# Patient Record
Sex: FEMALE | Race: WHITE | URBAN - METROPOLITAN AREA
[De-identification: names, ages, dates, MRNs, and addresses within clinical notes are randomized per-mention and may not be internally consistent; named-entity substitution may affect disease eponyms.]

---

## 2018-01-03 ENCOUNTER — OFFICE VISIT (OUTPATIENT)
Dept: FAMILY MEDICINE | Facility: CLINIC | Age: 28
End: 2018-01-03
Payer: COMMERCIAL

## 2018-01-03 VITALS
WEIGHT: 186 LBS | HEART RATE: 68 BPM | SYSTOLIC BLOOD PRESSURE: 112 MMHG | BODY MASS INDEX: 30.99 KG/M2 | HEIGHT: 65 IN | TEMPERATURE: 98.3 F | DIASTOLIC BLOOD PRESSURE: 68 MMHG

## 2018-01-03 DIAGNOSIS — N39.0 URINARY TRACT INFECTION WITHOUT HEMATURIA, SITE UNSPECIFIED: Primary | ICD-10-CM

## 2018-01-03 DIAGNOSIS — R30.0 DYSURIA: ICD-10-CM

## 2018-01-03 LAB
BACTERIA #/AREA URNS HPF: ABNORMAL /HPF
NON-SQ EPI CELLS #/AREA URNS LPF: ABNORMAL /LPF
RBC #/AREA URNS AUTO: ABNORMAL /HPF
WBC #/AREA URNS AUTO: ABNORMAL /HPF

## 2018-01-03 PROCEDURE — 99213 OFFICE O/P EST LOW 20 MIN: CPT | Performed by: PHYSICIAN ASSISTANT

## 2018-01-03 PROCEDURE — 81015 MICROSCOPIC EXAM OF URINE: CPT | Performed by: PHYSICIAN ASSISTANT

## 2018-01-03 PROCEDURE — 87086 URINE CULTURE/COLONY COUNT: CPT | Performed by: PHYSICIAN ASSISTANT

## 2018-01-03 RX ORDER — CIPROFLOXACIN 250 MG/1
250 TABLET, FILM COATED ORAL 2 TIMES DAILY
Qty: 6 TABLET | Refills: 0 | Status: SHIPPED | OUTPATIENT
Start: 2018-01-03 | End: 2018-02-16

## 2018-01-03 NOTE — MR AVS SNAPSHOT
After Visit Summary   1/3/2018    Kathie Hidalgo    MRN: [id number]           Patient Information     Date Of Birth          1990        Visit Information        Provider Department      1/3/2018 7:20 AM Haily Hart PA-C [facility]        Today's Diagnoses     Urinary tract infection without hematuria, site unspecified    -  1    Dysuria          Care Instructions    Increase fluids  Continue with AZO until burning improves  Antibiotics as recommended.    Call if not improving!    DEVAN Sevilla PA-C            Follow-ups after your visit        Who to contact     If you have questions or need follow up information about today's clinic visit or your schedule please contact [facility] directly at [phone].  Normal or non-critical lab and imaging results will be communicated to you by Loctronixhart, letter or phone within 4 business days after the clinic has received the results. If you do not hear from us within 7 days, please contact the clinic through Loctronixhart or phone. If you have a critical or abnormal lab result, we will notify you by phone as soon as possible.  Submit refill requests through [email or web address] or call your pharmacy and they will forward the refill request to us. Please allow 3 business days for your refill to be completed.          Additional Information About Your Visit        MyChart Information     [email or web address] gives you secure access to your electronic health record. If you see a primary care provider, you can also send messages to your care team and make appointments. If you have questions, please call your primary care clinic.  If you do not have a primary care provider, please call [phone] and they will assist you.        Care EveryWhere ID     This is your Care EveryWhere ID. This could be used by other organizations to access your [place] medical records  [id number]        Your Vitals Were     Pulse Temperature Height BMI  "(Body Mass Index)          68 98.3  F (36.8  C) (Oral) 5' 5.25\" (1.657 m) 30.72 kg/m2         Blood Pressure from Last 3 Encounters:   01/03/18 112/68   04/13/16 112/73   08/25/15 114/75    Weight from Last 3 Encounters:   01/03/18 186 lb (84.4 kg)   04/13/16 167 lb (75.8 kg)   08/25/15 164 lb 1.6 oz (74.4 kg)              We Performed the Following     Urine Culture Aerobic Bacterial     Urine Microscopic          Today's Medication Changes          These changes are accurate as of: 1/3/18  7:57 AM.  If you have any questions, ask your nurse or doctor.               Start taking these medicines.        Dose/Directions    ciprofloxacin 250 MG tablet   Commonly known as:  CIPRO   Used for:  Dysuria   Started by:  Haily Hart PA-C        Dose:  250 mg   Take 1 tablet (250 mg) by mouth 2 times daily   Quantity:  6 tablet   Refills:  0            Where to get your medicines      These medications were sent to [facility] [place] [address]  [address] [facility], [facility] [address]     Phone:  [phone]     ciprofloxacin 250 MG tablet                Primary Care Provider    None Specified       No primary provider on file.        Equal Access to Services     [facility]UPERTO ROBB[facility]: Brionna herberto Soalexali, waaxda luqadaha, qaybta kaalmada adeegyada, nilsa stahl. So [facility] [phone].    ATENCIÓN: Si habla español, tiene a borges disposición servicios gratuitos de asistencia lingüística. Llame al [phone].    We comply with applicable federal civil rights laws and [place] laws. We do not discriminate on the basis of race, color, national origin, age, disability, sex, sexual orientation, or gender identity.            Thank you!     Thank you for choosing [facility]  for your care. Our goal is always to provide you with excellent care. Hearing back from our patients is one way we can continue to improve our services. " Please take a few minutes to complete the written survey that you may receive in the mail after your visit with us. Thank you!             Your Updated Medication List - Protect others around you: Learn how to safely use, store and throw away your medicines at www.disposemymeds.org.          This list is accurate as of: 1/3/18  7:57 AM.  Always use your most recent med list.                   Brand Name Dispense Instructions for use Diagnosis    ciprofloxacin 250 MG tablet    CIPRO    6 tablet    Take 1 tablet (250 mg) by mouth 2 times daily    Dysuria

## 2018-01-03 NOTE — PROGRESS NOTES
"  SUBJECTIVE:   Kathie Hidalgo is a 27 year old female who presents to clinic today for the following health issues:      URINARY TRACT SYMPTOMS  Onset: 2 days     Description:   Painful urination (Dysuria): YES  Blood in urine (Hematuria): no   Delay in urine (Hesitency): no     Intensity: moderate    Progression of Symptoms:  Worsening, couldn't sleep    Accompanying Signs & Symptoms:  Fever/chills: no   Flank pain no   Nausea and vomiting: no   Any vaginal symptoms: none  Abdominal/Pelvic Pain: no     History:   History of frequent UTI's: YES  History of kidney stones: no   Sexually Active: YES, does goes to the bathroom   Possibility of pregnancy: No    Precipitating factors:   None     Therapies Tried and outcome: AZO this morning       -------------------------------------    Problem list and histories reviewed & adjusted, as indicated.  Additional history: as documented    Reviewed and updated as needed this visit by clinical staff     Reviewed and updated as needed this visit by Provider         ROS:  Constitutional, HEENT, cardiovascular, pulmonary, gi and gu systems are negative, except as otherwise noted.      OBJECTIVE:   /68 (BP Location: Right arm, Cuff Size: Adult Regular)  Pulse 68  Temp 98.3  F (36.8  C) (Oral)  Ht 5' 5.25\" (1.657 m)  Wt 186 lb (84.4 kg)  BMI 30.72 kg/m2  Body mass index is 30.72 kg/(m^2).  GENERAL: healthy, alert and no distress  RESP: lungs clear to auscultation - no rales, rhonchi or wheezes  CV: regular rate and rhythm, normal S1 S2, no S3 or S4, no murmur, click or rub, no peripheral edema and peripheral pulses strong  ABDOMEN: soft, nontender, no hepatosplenomegaly, no masses and bowel sounds normal  BACK: no CVA tenderness, no paralumbar tenderness    Diagnostic Test Results:  none     ASSESSMENT/PLAN:       ICD-10-CM    1. Urinary tract infection without hematuria, site unspecified N39.0    2. Dysuria R30.0 Urine Microscopic     Urine Culture Aerobic Bacterial     " ciprofloxacin (CIPRO) 250 MG tablet     CANCELED: *UA reflex to Microscopic and Culture ([place] and [facility] (except [place] and [place])     UA with +bacteria, WBCs.  Encouraged increase in fluids, voiding before and after intercourse, and good hygiene. Signs and symptoms of pyelonephritis mentioned.  Follow up with primary care physician if not improving.    Haily Hart PA-C  [facility]

## 2018-01-03 NOTE — PATIENT INSTRUCTIONS
Increase fluids  Continue with AZO until burning improves  Antibiotics as recommended.    Call if not improving!    DEVAN Sevilla, PASrinivasanC

## 2018-01-04 LAB
BACTERIA SPEC CULT: NORMAL
SPECIMEN SOURCE: NORMAL

## 2018-02-16 ENCOUNTER — OFFICE VISIT (OUTPATIENT)
Dept: FAMILY MEDICINE | Facility: CLINIC | Age: 28
End: 2018-02-16

## 2018-02-16 VITALS
DIASTOLIC BLOOD PRESSURE: 57 MMHG | OXYGEN SATURATION: 98 % | RESPIRATION RATE: 18 BRPM | WEIGHT: 187 LBS | TEMPERATURE: 98.5 F | HEIGHT: 65 IN | BODY MASS INDEX: 31.16 KG/M2 | SYSTOLIC BLOOD PRESSURE: 101 MMHG | HEART RATE: 81 BPM

## 2018-02-16 DIAGNOSIS — J01.90 ACUTE SINUSITIS, RECURRENCE NOT SPECIFIED, UNSPECIFIED LOCATION: Primary | ICD-10-CM

## 2018-02-16 PROCEDURE — 99213 OFFICE O/P EST LOW 20 MIN: CPT | Performed by: NURSE PRACTITIONER

## 2018-02-16 RX ORDER — AMOXICILLIN 875 MG
875 TABLET ORAL 2 TIMES DAILY
Qty: 20 TABLET | Refills: 0 | Status: SHIPPED | OUTPATIENT
Start: 2018-02-16

## 2018-02-16 NOTE — NURSING NOTE
"Chief Complaint   Patient presents with     Pharyngitis       Initial /57 (BP Location: Right arm, Patient Position: Chair, Cuff Size: Adult Large)  Pulse 81  Temp 98.5  F (36.9  C) (Oral)  Resp 18  Ht 5' 5.25\" (1.657 m)  Wt 187 lb (84.8 kg)  LMP 02/01/2018  SpO2 98%  BMI 30.88 kg/m2 Estimated body mass index is 30.88 kg/(m^2) as calculated from the following:    Height as of this encounter: 5' 5.25\" (1.657 m).    Weight as of this encounter: 187 lb (84.8 kg).  Medication Reconciliation: kalina Engle CMA (AAMA)      "

## 2018-02-16 NOTE — MR AVS SNAPSHOT
After Visit Summary   2/16/2018    Kathie Hidalgo    MRN: [id number]           Patient Information     Date Of Birth          1990        Visit Information        Provider Department      2/16/2018 3:30 PM Danette Noyola APRN CNP [facility]        Today's Diagnoses     Acute sinusitis, recurrence not specified, unspecified location    -  1      Care Instructions    Begin taking plain mucinex 600mg twice a day  And use flonase 2 sprays each nostril once a day  Take aleve for pain   Take the antibiotic twice a day for 10 days           Follow-ups after your visit        Who to contact     If you have questions or need follow up information about today's clinic visit or your schedule please contact [facility] directly at [phone].  Normal or non-critical lab and imaging results will be communicated to you by SanT[employer]¡stihart, letter or phone within 4 business days after the clinic has received the results. If you do not hear from us within 7 days, please contact the clinic through SanT[employer]¡stihart or phone. If you have a critical or abnormal lab result, we will notify you by phone as soon as possible.  Submit refill requests through Atmosferiq or call your pharmacy and they will forward the refill request to us. Please allow 3 business days for your refill to be completed.          Additional Information About Your Visit        MyChart Information     [employer] gives you secure access to your electronic health record. If you see a primary care provider, you can also send messages to your care team and make appointments. If you have questions, please call your primary care clinic.  If you do not have a primary care provider, please call [phone] and they will assist you.        Care EveryWhere ID     This is your Care EveryWhere ID. This could be used by other organizations to access your [place] medical records  [id number]        Your Vitals Were     Pulse Temperature Respirations  "Height Last Period Pulse Oximetry    81 98.5  F (36.9  C) (Oral) 18 5' 5.25\" (1.657 m) 02/01/2018 98%    BMI (Body Mass Index)                   30.88 kg/m2            Blood Pressure from Last 3 Encounters:   02/16/18 101/57   01/03/18 112/68   04/13/16 112/73    Weight from Last 3 Encounters:   02/16/18 187 lb (84.8 kg)   01/03/18 186 lb (84.4 kg)   04/13/16 167 lb (75.8 kg)              Today, you had the following     No orders found for display         Today's Medication Changes          These changes are accurate as of 2/16/18  4:06 PM.  If you have any questions, ask your nurse or doctor.               Start taking these medicines.        Dose/Directions    amoxicillin 875 MG tablet   Commonly known as:  AMOXIL   Used for:  Acute sinusitis, recurrence not specified, unspecified location   Started by:  Danette Noyola APRN CNP        Dose:  875 mg   Take 1 tablet (875 mg) by mouth 2 times daily   Quantity:  20 tablet   Refills:  0         Stop taking these medicines if you haven't already. Please contact your care team if you have questions.     ciprofloxacin 250 MG tablet   Commonly known as:  CIPRO   Stopped by:  Danette Noyola APRN CNP                Where to get your medicines      These medications were sent to [facility] [address], [address]  [address], [address] [facility] [address]     Phone:  [phone]     amoxicillin 875 MG tablet                Primary Care Provider Fax #    Physician No Ref-Primary [phone]       No address on file        Equal Access to Services     [facility]: Hadii priscilla shepard hadasho Soalexali, waaxda luqadaha, qaybta kaalmada adequeta, nilsa stahl. So [facility] [phone].    ATENCIÓN: Si habla español, tiene a borges disposición servicios gratuitos de asistencia lingüística. Llame al [phone].    We comply with applicable federal civil rights laws and [place] laws. We do not discriminate " on the basis of race, color, national origin, age, disability, sex, sexual orientation, or gender identity.            Thank you!     Thank you for choosing [facility]  for your care. Our goal is always to provide you with excellent care. Hearing back from our patients is one way we can continue to improve our services. Please take a few minutes to complete the written survey that you may receive in the mail after your visit with us. Thank you!             Your Updated Medication List - Protect others around you: Learn how to safely use, store and throw away your medicines at www.disposemymeds.org.          This list is accurate as of 2/16/18  4:06 PM.  Always use your most recent med list.                   Brand Name Dispense Instructions for use Diagnosis    amoxicillin 875 MG tablet    AMOXIL    20 tablet    Take 1 tablet (875 mg) by mouth 2 times daily    Acute sinusitis, recurrence not specified, unspecified location

## 2018-02-16 NOTE — PATIENT INSTRUCTIONS
Begin taking plain mucinex 600mg twice a day  And use flonase 2 sprays each nostril once a day  Take aleve for pain   Take the antibiotic twice a day for 10 days

## 2018-10-30 ENCOUNTER — HEALTH MAINTENANCE LETTER (OUTPATIENT)
Age: 28
End: 2018-10-30

## 2020-03-02 ENCOUNTER — HEALTH MAINTENANCE LETTER (OUTPATIENT)
Age: 30
End: 2020-03-02

## 2020-12-20 ENCOUNTER — HEALTH MAINTENANCE LETTER (OUTPATIENT)
Age: 30
End: 2020-12-20

## 2021-04-24 ENCOUNTER — HEALTH MAINTENANCE LETTER (OUTPATIENT)
Age: 31
End: 2021-04-24

## 2021-10-03 ENCOUNTER — HEALTH MAINTENANCE LETTER (OUTPATIENT)
Age: 31
End: 2021-10-03

## 2022-05-15 ENCOUNTER — HEALTH MAINTENANCE LETTER (OUTPATIENT)
Age: 32
End: 2022-05-15

## 2022-09-10 ENCOUNTER — HEALTH MAINTENANCE LETTER (OUTPATIENT)
Age: 32
End: 2022-09-10

## 2023-06-03 ENCOUNTER — HEALTH MAINTENANCE LETTER (OUTPATIENT)
Age: 33
End: 2023-06-03

## 2025-06-22 ENCOUNTER — HEALTH MAINTENANCE LETTER (OUTPATIENT)
Age: 35
End: 2025-06-22